# Patient Record
Sex: MALE | Race: WHITE | NOT HISPANIC OR LATINO | URBAN - METROPOLITAN AREA
[De-identification: names, ages, dates, MRNs, and addresses within clinical notes are randomized per-mention and may not be internally consistent; named-entity substitution may affect disease eponyms.]

---

## 2017-06-06 ENCOUNTER — EMERGENCY (EMERGENCY)
Facility: HOSPITAL | Age: 22
LOS: 1 days | Discharge: HOME | End: 2017-06-06
Attending: EMERGENCY MEDICINE
Payer: SELF-PAY

## 2017-06-06 VITALS
RESPIRATION RATE: 20 BRPM | DIASTOLIC BLOOD PRESSURE: 100 MMHG | SYSTOLIC BLOOD PRESSURE: 149 MMHG | WEIGHT: 220.02 LBS | OXYGEN SATURATION: 100 % | HEART RATE: 103 BPM | HEIGHT: 70 IN | TEMPERATURE: 98 F

## 2017-06-06 LAB
HCT VFR BLD CALC: 41.6 % — LOW (ref 42–52)
HGB BLD-MCNC: 14.6 G/DL — SIGNIFICANT CHANGE UP (ref 14–18)
MCHC RBC-ENTMCNC: 30.8 PG — SIGNIFICANT CHANGE UP (ref 27–31)
MCHC RBC-ENTMCNC: 35.1 G/DL — SIGNIFICANT CHANGE UP (ref 32–36)
MCV RBC AUTO: 87.8 FL — SIGNIFICANT CHANGE UP (ref 80–94)
PLATELET # BLD AUTO: 309 K/UL — SIGNIFICANT CHANGE UP (ref 150–400)
RBC # BLD: 4.74 M/UL — SIGNIFICANT CHANGE UP (ref 4.6–6.2)
RBC # FLD: 12.8 % — SIGNIFICANT CHANGE UP (ref 11–15.6)
WBC # BLD: 8 K/UL — SIGNIFICANT CHANGE UP (ref 4.8–10.8)
WBC # FLD AUTO: 8 K/UL — SIGNIFICANT CHANGE UP (ref 4.8–10.8)

## 2017-06-06 PROCEDURE — 99284 EMERGENCY DEPT VISIT MOD MDM: CPT

## 2017-06-06 RX ORDER — SODIUM CHLORIDE 9 MG/ML
1000 INJECTION INTRAMUSCULAR; INTRAVENOUS; SUBCUTANEOUS ONCE
Qty: 0 | Refills: 0 | Status: COMPLETED | OUTPATIENT
Start: 2017-06-06 | End: 2017-06-06

## 2017-06-06 RX ADMIN — SODIUM CHLORIDE 500 MILLILITER(S): 9 INJECTION INTRAMUSCULAR; INTRAVENOUS; SUBCUTANEOUS at 23:51

## 2017-06-06 NOTE — ED PROVIDER NOTE - OBJECTIVE STATEMENT
23 y/o M presents to the ED c/o abd pain and rectal bleeding (bright red blood) that began yesterday. Pt reports that his sx feel worse today and notes exacerbation of his abd pain with coughing. He states he has never experienced similar sx in the past. Denies fever, chills, n/v/d, constipation, pain with BM. Pt reports he has been eating normally, no new/foreign foods. Denies hx of hemorrhoids. Pt is not taking any blood thinners. No further complaints at this time.

## 2017-06-07 VITALS
RESPIRATION RATE: 18 BRPM | OXYGEN SATURATION: 99 % | DIASTOLIC BLOOD PRESSURE: 81 MMHG | SYSTOLIC BLOOD PRESSURE: 129 MMHG | TEMPERATURE: 98 F | HEART RATE: 82 BPM

## 2017-06-07 LAB
ALBUMIN SERPL ELPH-MCNC: 4.7 G/DL — SIGNIFICANT CHANGE UP (ref 3.3–5.2)
ALP SERPL-CCNC: 89 U/L — SIGNIFICANT CHANGE UP (ref 40–120)
ALT FLD-CCNC: 57 U/L — HIGH
ANION GAP SERPL CALC-SCNC: 15 MMOL/L — SIGNIFICANT CHANGE UP (ref 5–17)
AST SERPL-CCNC: 39 U/L — SIGNIFICANT CHANGE UP
BILIRUB SERPL-MCNC: 0.5 MG/DL — SIGNIFICANT CHANGE UP (ref 0.4–2)
BUN SERPL-MCNC: 12 MG/DL — SIGNIFICANT CHANGE UP (ref 8–20)
CALCIUM SERPL-MCNC: 9.6 MG/DL — SIGNIFICANT CHANGE UP (ref 8.6–10.2)
CHLORIDE SERPL-SCNC: 100 MMOL/L — SIGNIFICANT CHANGE UP (ref 98–107)
CO2 SERPL-SCNC: 25 MMOL/L — SIGNIFICANT CHANGE UP (ref 22–29)
CREAT SERPL-MCNC: 0.89 MG/DL — SIGNIFICANT CHANGE UP (ref 0.5–1.3)
GLUCOSE SERPL-MCNC: 87 MG/DL — SIGNIFICANT CHANGE UP (ref 70–115)
POTASSIUM SERPL-MCNC: 3.5 MMOL/L — SIGNIFICANT CHANGE UP (ref 3.5–5.3)
POTASSIUM SERPL-SCNC: 3.5 MMOL/L — SIGNIFICANT CHANGE UP (ref 3.5–5.3)
PROT SERPL-MCNC: 7.3 G/DL — SIGNIFICANT CHANGE UP (ref 6.6–8.7)
SODIUM SERPL-SCNC: 140 MMOL/L — SIGNIFICANT CHANGE UP (ref 135–145)

## 2017-06-07 PROCEDURE — 80053 COMPREHEN METABOLIC PANEL: CPT

## 2017-06-07 PROCEDURE — 74177 CT ABD & PELVIS W/CONTRAST: CPT | Mod: 26

## 2017-06-07 PROCEDURE — 85027 COMPLETE CBC AUTOMATED: CPT

## 2017-06-07 PROCEDURE — 74177 CT ABD & PELVIS W/CONTRAST: CPT

## 2017-06-07 PROCEDURE — 82272 OCCULT BLD FECES 1-3 TESTS: CPT

## 2017-06-07 PROCEDURE — 99284 EMERGENCY DEPT VISIT MOD MDM: CPT | Mod: 25

## 2018-04-16 NOTE — ED ADULT NURSE NOTE - SCORE
OFFICE NOTE    ASSESSMENT/PLAN:  Neck swelling  I do not detect any pathology or swollen glands or masses on his exam today. We have consulted ENT last fall whose workup was also negative. Clearly patient is concerned that he could have a malignancy. We talked about that today. We talked about the unlikelihood that these glands by coming and going in a day can be of any serious pathology. We talked about malignancy being nodes that are constant, ever increasing in size, and usually larger than 1/2 inch. I suspect in fact that he is not feeling lymph nodes in his occipital region but rather muscular tightness and swelling at the insertion. Heat and massage might be most effective for when this occurs. He seemed appreciative and reassured with our discussion today. I urged him that if he has a lymph node that he can feel that remains persistently swollen for more than 1-2 weeks I would like to examine it.              Chief Complaint   Patient presents with   • Follow-up     follow up \"glands\"  - seen 12/14/17 by Erica and 8/15/17 by Dr Lux       HISTORY OF PRESENT ILLNESS  Rowdy Fried is a 37 year old male that presents for concern of intermittent swelling of what he thinks are lymph nodes in his neck. They're located in the occipital area and in the left supraclavicular area. Sometimes he feels discomfort in the right supraclavicular area but he doesn't feel any masses there. He says these can come and go in the course of the day. In particular some mornings when he wakes up he can feel and his wife can see some swelling along the occiput bilaterally. With this swells up he does have some neck pain and discomfort with range of motion of his neck. Then within hours or day it will go down. This is his third visit for this concern with visits on December 14, November 6, and August 15 of last year. At one point he was evaluated by ear nose and throat do not find any issues. Last year with these complaints  he was having some sore throat that we thought was reflux related. Patient states that the sore throat and reflux issues have gotten much better with lifestyle changes and a particular not eating late at night. He works as a  which involves use of his upper extremities and a lot of contorted positions.    He denies weight loss, night sweats, fever or chills, or swollen glands elsewhere. He denies fatigue or malaise.      HISTORIES    ALLERGIES:   Allergen Reactions   • Morphine SHORTNESS OF BREATH and Other (See Comments)     Respiratory abnormality   • Chantix [Varenicline Tartrate] RASH and Other (See Comments)     Chest pain, unspecified       Patient Active Problem List    Diagnosis Date Noted   • Tobacco use disorder 11/18/2013     Priority: Low       Outpatient Medications Prior to Visit   Medication Sig Dispense Refill   • buPROPion (WELLBUTRIN SR) 150 MG 12 hr tablet Take 1 tablet by mouth 2 times daily. 60 tablet 3   • omeprazole (PRILOSEC) 20 MG capsule Take 1 capsule by mouth daily. 30 capsule 11     No facility-administered medications prior to visit.        Past Surgical History:   Procedure Laterality Date   • Tonsillectomy and adenoidectomy  1/1/1986       Social History   Substance Use Topics   • Smoking status: Current Every Day Smoker     Packs/day: 0.50     Years: 20.00     Types: Cigarettes   • Smokeless tobacco: Never Used      Comment: previously discussed   • Alcohol use 2.4 - 3.0 oz/week     4 - 5 Cans of beer per week      Comment: 2x a week       Family History   Problem Relation Age of Onset   • Myocardial Infarction Paternal Grandfather    • Cancer, Lung Paternal Grandfather    • Cancer Paternal Grandfather      prostate   • Hypertension Father    • Diabetes Mother          PHYSICAL EXAM  Visit Vitals  /84 (BP Location: [facility], Patient Position: Sitting, Cuff Size: Regular)   Pulse 76   Temp 98.1 °F (36.7 °C) (Oral)   Resp 16   Ht 6' (1.829 m)   Wt 97.1 kg   BMI 29.02  kg/m²     Constitutional:  Healthy-appearing muscular young man. Does not appear anxious or distressed.   HEENT:  I cannot palpate any posterior or anterior cervical adenopathy. I do not palpate any supraclavicular adenopathy. Oropharynx without injection or exudate. Thyroid not enlarged. Examination of the scalp and skin show no ulcerations or pustules or breaks in the skin integrity.   Abdomen:  Soft, non-distended, non-tender, bowel sounds are active. No hepatomegaly or splenomegaly. No masses.  Lymphatic:  No axillary or inguinal adenopathy.    Rudy Lux MD   3

## 2020-09-10 NOTE — ED ADULT NURSE NOTE - NS ED NURSE IV DC DT
Okay.  He has a steroid cream listed in his chart.  He has also had zoster in the past that required Valtrex.  I am not sure what what rash he is asking me to treat.  He is going to have to come in to have the rash treated.  
Patient called stating he has been given medication for a rash/bumps on his back in the past and would like to know if it could be called in again for him. Please review and call him back on his mobile.  
Patient stated that he will see Dr. Erickson on 9/14/2020 as scheduled.  
07-Jun-2017 02:02

## 2024-03-12 NOTE — ED PROVIDER NOTE - NS ED MD SCRIBE ATTENDING SCRIBE SECTIONS
Medication: carvedilol (COREG) 25 MG tablet   Last office visit date: 02/22/2024  Medication Refill Protocol Failed.   Last BP was under 140/90 or if patient has diabetes, CAD, or PVD, BP under 130/80 in past year     Per protocol: medication titration appt scheduled for 04/01/2024- 3 month courtesy refill approved, patient informed via [employer] text.   REVIEW OF SYSTEMS/PAST MEDICAL/SURGICAL/SOCIAL HISTORY/HISTORY OF PRESENT ILLNESS/DISPOSITION/HIV/PHYSICAL EXAM/VITAL SIGNS( Pullset)